# Patient Record
Sex: FEMALE | URBAN - METROPOLITAN AREA
[De-identification: names, ages, dates, MRNs, and addresses within clinical notes are randomized per-mention and may not be internally consistent; named-entity substitution may affect disease eponyms.]

---

## 2017-07-27 ENCOUNTER — TELEPHONE (OUTPATIENT)
Dept: CARDIOLOGY | Facility: CLINIC | Age: 22
End: 2017-07-27

## 2017-07-27 NOTE — TELEPHONE ENCOUNTER
Patient pharmacy is requesting Metoprolol ER Succinate 25mg tabs, but patient does not have to medication on medication list.

## 2017-11-19 ENCOUNTER — HEALTH MAINTENANCE LETTER (OUTPATIENT)
Age: 22
End: 2017-11-19

## 2018-09-04 ENCOUNTER — TELEPHONE (OUTPATIENT)
Dept: CARDIOLOGY | Facility: CLINIC | Age: 23
End: 2018-09-04

## 2018-09-04 NOTE — TELEPHONE ENCOUNTER
Pharmacy requesting to refill Fludrocortisone 0.1 mg tablets.  This medication is not on the patient's list.    Pharmacy: [facility] [address]   Phone: [phone]  Fax: [phone]

## 2018-09-04 NOTE — TELEPHONE ENCOUNTER
Called and spoke with Pharmacist, Jasbir.  Informed him that Pt is seen by Dr Magana through [facility] in [facility].  They will need to be forwarded the Pts refill request.  Jasbir verbalized understanding and updated the provider information.    Deyanira Hatfield LPN

## 2020-03-02 ENCOUNTER — HEALTH MAINTENANCE LETTER (OUTPATIENT)
Age: 25
End: 2020-03-02

## 2020-12-14 ENCOUNTER — HEALTH MAINTENANCE LETTER (OUTPATIENT)
Age: 25
End: 2020-12-14

## 2021-04-18 ENCOUNTER — HEALTH MAINTENANCE LETTER (OUTPATIENT)
Age: 26
End: 2021-04-18

## 2021-10-02 ENCOUNTER — HEALTH MAINTENANCE LETTER (OUTPATIENT)
Age: 26
End: 2021-10-02

## 2022-05-14 ENCOUNTER — HEALTH MAINTENANCE LETTER (OUTPATIENT)
Age: 27
End: 2022-05-14

## 2022-09-03 ENCOUNTER — HEALTH MAINTENANCE LETTER (OUTPATIENT)
Age: 27
End: 2022-09-03

## 2023-06-03 ENCOUNTER — HEALTH MAINTENANCE LETTER (OUTPATIENT)
Age: 28
End: 2023-06-03